# Patient Record
Sex: FEMALE | Race: WHITE | URBAN - METROPOLITAN AREA
[De-identification: names, ages, dates, MRNs, and addresses within clinical notes are randomized per-mention and may not be internally consistent; named-entity substitution may affect disease eponyms.]

---

## 2017-05-12 ENCOUNTER — IMPORTED ENCOUNTER (OUTPATIENT)
Dept: URBAN - METROPOLITAN AREA CLINIC 50 | Facility: CLINIC | Age: 82
End: 2017-05-12

## 2017-05-15 ENCOUNTER — IMPORTED ENCOUNTER (OUTPATIENT)
Dept: URBAN - METROPOLITAN AREA CLINIC 50 | Facility: CLINIC | Age: 82
End: 2017-05-15

## 2021-04-17 ASSESSMENT — VISUAL ACUITY
OD_CC: 20/40-
OS_CC: 20/50

## 2021-04-17 ASSESSMENT — TONOMETRY
OS_IOP_MMHG: 10
OD_IOP_MMHG: 14

## 2025-02-09 NOTE — PATIENT DISCUSSION
" Case: [id number] Date/Time: 02/09/25 1500    Procedure: APPENDECTOMY, LAPAROSCOPIC    Location: [facility] 01 / SURGERY [facility]    Surgeons: Valorie Elizalde M.D.            Relevant Problems   No relevant active problems     Acute appendicitis.    BP (!) 181/96   Pulse 89   Temp 37.1 °C (98.8 °F) (Temporal)   Resp 14   Ht 1.676 m (5' 6\")   Wt 88.7 kg (195 lb 8.8 oz)   LMP 03/06/2018   SpO2 90%   BMI 31.56 kg/m²     Physical Exam    Airway   Mallampati: II  TM distance: >3 FB  Neck ROM: full       Cardiovascular - normal exam  Rhythm: regular  Rate: normal  (-) murmur     Dental - normal exam           Pulmonary - normal exam  Breath sounds clear to auscultation     Abdominal    Neurological - normal exam                   Anesthesia Plan    ASA 2       Plan - general       Airway plan will be ETT          Induction: intravenous    Postoperative Plan: Postoperative administration of opioids is intended.    Pertinent diagnostic labs and testing reviewed    Informed Consent:    Anesthetic plan and risks discussed with patient.    Use of blood products discussed with: patient whom consented to blood products.           " Recommended observation.